# Patient Record
Sex: FEMALE
[De-identification: names, ages, dates, MRNs, and addresses within clinical notes are randomized per-mention and may not be internally consistent; named-entity substitution may affect disease eponyms.]

---

## 2018-07-31 ENCOUNTER — HOSPITAL ENCOUNTER (EMERGENCY)
Dept: HOSPITAL 97 - ER | Age: 38
Discharge: HOME | End: 2018-07-31
Payer: SELF-PAY

## 2018-07-31 DIAGNOSIS — Z88.6: ICD-10-CM

## 2018-07-31 DIAGNOSIS — N61.0: Primary | ICD-10-CM

## 2018-07-31 LAB
BUN BLD-MCNC: 12 MG/DL (ref 7–18)
GLUCOSE SERPLBLD-MCNC: 119 MG/DL (ref 74–106)
HCT VFR BLD CALC: 39.5 % (ref 36–45)
LYMPHOCYTES # SPEC AUTO: 2.3 K/UL (ref 0.7–4.9)
MCH RBC QN AUTO: 29.4 PG (ref 27–35)
MCV RBC: 88 FL (ref 80–100)
PMV BLD: 9.8 FL (ref 7.6–11.3)
POTASSIUM SERPL-SCNC: 3.5 MMOL/L (ref 3.5–5.1)
RBC # BLD: 4.49 M/UL (ref 3.86–4.86)

## 2018-07-31 PROCEDURE — 96365 THER/PROPH/DIAG IV INF INIT: CPT

## 2018-07-31 PROCEDURE — 96361 HYDRATE IV INFUSION ADD-ON: CPT

## 2018-07-31 PROCEDURE — 85025 COMPLETE CBC W/AUTO DIFF WBC: CPT

## 2018-07-31 PROCEDURE — 81003 URINALYSIS AUTO W/O SCOPE: CPT

## 2018-07-31 PROCEDURE — 96375 TX/PRO/DX INJ NEW DRUG ADDON: CPT

## 2018-07-31 PROCEDURE — 87040 BLOOD CULTURE FOR BACTERIA: CPT

## 2018-07-31 PROCEDURE — 96366 THER/PROPH/DIAG IV INF ADDON: CPT

## 2018-07-31 PROCEDURE — 80048 BASIC METABOLIC PNL TOTAL CA: CPT

## 2018-07-31 PROCEDURE — 99284 EMERGENCY DEPT VISIT MOD MDM: CPT

## 2018-07-31 PROCEDURE — 76641 ULTRASOUND BREAST COMPLETE: CPT

## 2018-07-31 PROCEDURE — 36415 COLL VENOUS BLD VENIPUNCTURE: CPT

## 2018-07-31 NOTE — EDPHYS
Physician Documentation                                                                           

 [facility]                                                                

Name: Erin Zoila                                                                              

Age: 38 yrs                                                                                       

Sex: Female                                                                                       

: 1980                                                                                   

MRN: [id number]                                                                                   

Arrival Date: 2018                                                                          

Time: 11:39                                                                                       

Account#: [id number]                                                                            

Bed 16                                                                                            

Private MD:                                                                                       

Kuldeep Lebron                                                                      

HPI:                                                                                              

                                                                                             

12:30 This 38 yrs old  Female presents to ER via Ambulatory with complaints of Breast cp  

      Problem.                                                                                    

12:30 The patient presents with cellulitis of the left breast, the patient presents with a    cp  

      swollen area of the left breast. Description: erythematous, swollen, warm. Onset: The       

      symptoms/episode began/occurred today. Associated signs and symptoms: Pertinent             

      negatives: discharge, drainage, fever.                                                      

12:30 Modifying factors: the symptoms are aggravated by touching.                             cp  

                                                                                                  

OB/GYN:                                                                                           

11:45 LMP N/A - Hysterectomy                                                                  aj  

                                                                                                  

Historical:                                                                                       

- Allergies:                                                                                      

11:45 Morphine;                                                                               aj  

- Home Meds:                                                                                      

11:45 sulfadiazine 500 mg oral tab 2 tabs once daily [Active];                                aj  

- PMHx:                                                                                           

11:45 Crohn's; Ovarian Cancer; Cervical Cancer;                                               aj  

- PSHx:                                                                                           

11:45 Appendectomy; Hysterectomy;                                                             aj  

                                                                                                  

- Immunization history:: Adult Immunizations up to date.                                          

- Social history:: Smoking status: Patient/guardian denies using tobacco.                         

- Ebola Screening: : Patient negative for fever greater than or equal to 101.5 degrees            

  Fahrenheit, and additional compatible Ebola Virus Disease symptoms Patient denies               

  exposure to infectious person Patient denies travel to an Ebola-affected area in the            

  21 days before illness onset No symptoms or risks identified at this time.                      

                                                                                                  

                                                                                                  

ROS:                                                                                              

12:35 Constitutional: Negative for body aches, chills, fever, poor PO intake.                 cp  

12:35 Eyes: Negative for injury, pain, redness, and discharge.                                cp  

12:35 ENT: Negative for drainage from ear(s), ear pain, sore throat, difficulty swallowing,       

      difficulty handling secretions.                                                             

12:35 Cardiovascular: Negative for chest pain, edema, palpitations.                               

12:35 Respiratory: Negative for cough, shortness of breath, wheezing.                             

12:35 Abdomen/GI: Negative for abdominal pain, nausea, vomiting, and diarrhea.                    

12:35 Skin: Positive for erythema, swelling, of the left breast, tenderness.                      

12:35 All other systems are negative.                                                             

                                                                                                  

Exam:                                                                                             

12:40 Head/Face:  Normocephalic, atraumatic.                                                  cp  

12:40 Constitutional: The patient appears in no acute distress, alert, awake,                     

      non-diaphoretic, non-toxic, well developed, well nourished.                                 

12:40 Eyes: Periorbital structures: appear normal, Conjunctiva: normal, no exudate, no        cp  

      injection, Sclera: no appreciated abnormality, Lids and lashes: appear normal,              

      bilaterally.                                                                                

12:40 ENT: External ear(s): are unremarkable, Nose: is normal, Mouth: is normal, Posterior        

      pharynx: is normal, airway is patent, no erythema, no exudate.                              

12:40 Neck: ROM/movement: is normal, is supple, without pain, no range of motions limitations.    

12:40 Chest/axilla: Breasts: cellulitis, that is mild of the left breast, swelling, that is       

      mild upper left breast, tenderness, of the left breast.                                     

12:40 Cardiovascular: Rate: normal, Rhythm: regular.                                              

12:40 Respiratory: the patient does not display signs of respiratory distress,  Respirations:     

      normal, no use of accessory muscles, no retractions, no splinting, no tachypnea,            

      labored breathing, is not present, Breath sounds: are clear throughout, no decreased        

      breath sounds, no stridor, no wheezing.                                                     

12:40 Abdomen/GI: Inspection: abdomen appears normal, Palpation: abdomen is soft and              

      non-tender, in all quadrants.                                                               

12:40 Back: pain, is absent, ROM is normal.                                                       

12:40 Neuro: Orientation: to person, place \T\ time. Mentation: lucid, able to follow commands,   

      Cerebellar function: Motor: moves all fours, Sensation: is normal.                          

                                                                                                  

Vital Signs:                                                                                      

11:45  / 86; Pulse 101; Resp 20; Temp 98.2; Pulse Ox 99% on R/A; Weight 70.31 kg;       aj  

      Height 4 ft. 10 in. (147.32 cm);                                                            

12:48  / 90; Pulse 80; Resp 18; Pulse Ox 97% on R/A;                                    hj  

14:56  / 85; Pulse 85; Resp 18; Pulse Ox 100% on R/A;                                   hj  

11:45 Body Mass Index 32.39 (70.31 kg, 147.32 cm)                                             aj  

                                                                                                  

MDM:                                                                                              

12:00 Patient medically screened.                                                             cp  

12:30 Differential diagnosis: abscess, allergic reaction, cellulitis.                           

15:15 Data reviewed: vital signs, nurses notes, lab test result(s), radiologic studies,       cp  

      ultrasound.                                                                                 

15:15 Counseling: I had a detailed discussion with the patient and/or guardian regarding: the cp  

      historical points, exam findings, and any diagnostic results supporting the                 

      discharge/admit diagnosis, lab results, radiology results, the need for outpatient          

      follow up, a family practitioner, to return to the emergency department if symptoms         

      worsen or persist or if there are any questions or concerns that arise at home.             

      Response to treatment: the patient's symptoms have mildly improved after treatment.         

                                                                                                  

                                                                                             

12:27 Order name: CBC with Diff; Complete Time: 13:03                                         cp  

                                                                                             

13:04 Interpretation: Normal except: WBC 13.2; NEYDA% 75.8; NEUT A 10.0.                          

                                                                                             

12:27 Order name: BMP; Complete Time: 13:28                                                     

                                                                                             

13:28 Interpretation: Normal except: ; GLUC 119.                                          

                                                                                             

12:27 Order name: Blood Culture Adult (2)                                                       

                                                                                             

13:45 Order name: Urine Dipstick--Ancillary (enter results); Complete Time: 14:01             bd  

                                                                                             

14:01 Interpretation: Normal except: UBLD 2+.                                                   

                                                                                             

12:30 Order name: Urine Dipstick-Ancillary (obtain specimen); Complete Time: 13:30              

                                                                                             

12:30 Order name: Urine Pregnancy Test (obtain specimen); Complete Time: 14:54                cp  

                                                                                             

13:10 Order name: BREAST/AXILLA, COMPLETE; Complete Time: 13:28                               EDMS

                                                                                                  

Administered Medications:                                                                         

12:27 CANCELLED (Physician Discretion): morphine 2 mg IVP once                                cp  

12:29 Not Given (Physician Discretion): Zofran 4 mg IVP once; over 2 minutes                  cp  

12:30 Drug: NS 0.9% 1000 ml Route: IV; Rate: 1 bolus; Site: right antecubital;                hj  

15:31 Follow up: IV Status: Completed infusion                                                hj  

12:30 Drug: TORadol 30 mg Route: IVP; Site: right antecubital;                                hj  

13:23 Follow up: Response: No adverse reaction; Pain is decreased                             hj  

13:43 Drug: Clindamycin 900 mg Route: IVPB; Infused Over: 30 mins; Site: left jugular;        hj  

15:31 Follow up: IV Status: Completed infusion                                                hj  

                                                                                                  

                                                                                                  

Disposition:                                                                                      

                                                                                             

15:18 Co-signature as Attending Physician, Kuldeep Meehan MD I agree with the assessment and  [facility] 

      plan of care.                                                                               

                                                                                                  

Disposition:                                                                                      

18 15:15 Discharged to Home. Impression: Mastitis of Left Breast.                           

- Condition is Stable.                                                                            

- Discharge Instructions: Mastitis.                                                               

- Prescriptions for Clindamycin HCl 300 mg Oral Capsule - take 1 capsule by ORAL route            

  every 6 hours for 10 days; 40 capsule. Bactrim - 160 mg Oral Tablet - take 1              

  tablet by ORAL route every 12 hours for 10 days; 20 tablet. Tramadol 50 mg Oral                 

  Tablet - take 1 tablet by ORAL route every 8 hours as needed; 12 tablet.                        

- Work release form, Medication Reconciliation Form, Thank You Letter, Antibiotic                 

  Education, Prescription Opioid Use form.                                                        

- Follow up: Private Physician; When: 2 - 3 days; Reason: Recheck today's complaints.             

- Problem is new.                                                                                 

- Symptoms have improved.                                                                         

                                                                                                  

                                                                                                  

                                                                                                  

Signatures:                                                                                       

Dispatcher [employer]                           EDMS                                                 

Jyothi Hdz RN RN aj Anderson, Corey, MD MD cha Joaquin, Henry RN                      Kuldeep Valencia, PA                         PA   cp                                                   

                                                                                                  

Corrections: (The following items were deleted from the chart)                                    

                                                                                             

12:27 12:27 morphine 2 mg IVP once ordered. cp                                                cp  

12:40 12:27 Extrmty Nonvasular Limited+[id number].BRZ ordered. EDMS                               EDMS

13:10 12:40 BREAST/AXILLA, LIMITED ordered. EDMS                                              EDMS

15:34 15:15 2018 15:15 Discharged to Home. Impression: Mastitis of Left Breast.           

      Condition is Stable. Forms are Medication Reconciliation Form, Thank You Letter,            

      Antibiotic Education, Prescription Opioid Use. Follow up: Private Physician; When: 2 -      

      3 days; Reason: Recheck today's complaints. Problem is new. Symptoms have improved. cp      

                                                                                                  

**************************************************************************************************

## 2018-07-31 NOTE — RAD REPORT
EXAM DESCRIPTION:  US - BREAST/AXILLA, COMPLETE - 7/31/2018 1:11 pm

 

CLINICAL HISTORY:  Left breast pain and swelling

 

FINDINGS:  Whole left breast sonography was performed.

 

Breast tissue overall appears edematous. There is heterogeneity in breast tissue involving all quadra
nts of the breast. A single defined mass is not seen. A few small lymph nodes are seen in the lateral
 aspect of the breast. A 9 mm cyst is present retroareolar left breast. No defined abscess or drainab
le fluid collection.

 

IMPRESSION:  Suspected mastitis without defined abscess or drainable fluid collection.

 

An inflammatory carcinoma or other aggressive process cannot be excluded. Follow-up could be performe
d with mammography and/ or sonography after adequate treatment for the suspected infectious/ inflamma
tory process.

## 2018-07-31 NOTE — ER
Nurse's Notes                                                                                     

 [facility]                                                                

Name: Erin                                                                               

Age: 38 yrs                                                                                       

Sex: Female                                                                                       

: 1980                                                                                   

MRN: [id number]                                                                                   

Arrival Date: 2018                                                                          

Time: 11:39                                                                                       

Account#: [id number]                                                                            

Bed 16                                                                                            

Private MD:                                                                                       

Diagnosis: Mastitis of Left Breast                                                                

                                                                                                  

Presentation:                                                                                     

                                                                                             

11:43 Presenting complaint: Patient states: Redness and swelling to left breast that started  aj  

      today. Transition of care: patient was not received from another setting of care. Onset     

      of symptoms was 2018. Risk Assessment: Do you want to hurt yourself or someone     

      else? Patient reports no desire to harm self or others. Initial Sepsis Screen: Does the     

      patient meet any 2 criteria? No. Patient's initial sepsis screen is negative. Does the      

      patient have a suspected source of infection? No. Patient's initial sepsis screen is        

      negative. Care prior to arrival: None.                                                      

11:43 Method Of Arrival: Ambulatory                                                           aj  

11:43 Acuity: SAMIA 3                                                                           aj  

                                                                                                  

Triage Assessment:                                                                                

11:45 General: Appears in no apparent distress. comfortable, Behavior is calm, cooperative,   aj  

      appropriate for age. Pain: Complains of pain in left breast. Neuro: Level of                

      Consciousness is awake, alert, obeys commands, Oriented to person, place, time,             

      situation, Appropriate for age. Respiratory: Airway is patent Respiratory effort is         

      even, unlabored, Respiratory pattern is regular, symmetrical. Derm: Skin is intact, is      

      healthy with good turgor, Skin is pink, warm \T\ dry. normal, Redness and inflammation to   

      left breast.                                                                                

                                                                                                  

OB/GYN:                                                                                           

11:45 LMP N/A - Hysterectomy                                                                  aj  

                                                                                                  

Historical:                                                                                       

- Allergies:                                                                                      

11:45 Morphine;                                                                               aj  

- Home Meds:                                                                                      

11:45 sulfadiazine 500 mg oral tab 2 tabs once daily [Active];                                aj  

- PMHx:                                                                                           

11:45 Crohn's; Ovarian Cancer; Cervical Cancer;                                               aj  

- PSHx:                                                                                           

11:45 Appendectomy; Hysterectomy;                                                             aj  

                                                                                                  

- Immunization history:: Adult Immunizations up to date.                                          

- Social history:: Smoking status: Patient/guardian denies using tobacco.                         

- Ebola Screening: : Patient negative for fever greater than or equal to 101.5 degrees            

  Fahrenheit, and additional compatible Ebola Virus Disease symptoms Patient denies               

  exposure to infectious person Patient denies travel to an Ebola-affected area in the            

  21 days before illness onset No symptoms or risks identified at this time.                      

                                                                                                  

                                                                                                  

Screenin:49 Abuse screen: Denies threats or abuse. Denies injuries from another. Nutritional        hj  

      screening: No deficits noted. Tuberculosis screening: No symptoms or risk factors           

      identified. Fall Risk None identified.                                                      

                                                                                                  

Assessment:                                                                                       

11:49 General: Appears in no apparent distress. uncomfortable, Behavior is calm, cooperative, hj  

      appropriate for age. Pain: Complains of pain in chest and left breast. Neuro: Level of      

      Consciousness is awake, alert, obeys commands, Oriented to person, place, time,             

      situation, Appropriate for age. Cardiovascular: Capillary refill < 3 seconds Patient's      

      skin is warm and dry. Respiratory: Airway is patent Respiratory effort is even,             

      unlabored, Respiratory pattern is regular, symmetrical. GI: No signs and/or symptoms        

      were reported involving the gastrointestinal system. : No signs and/or symptoms were      

      reported regarding the genitourinary system. EENT: No signs and/or symptoms were            

      reported regarding the EENT system. Derm: lump on the L breast. Musculoskeletal: No         

      signs and/or symptoms reported regarding the musculoskeletal system.                        

12:22 Reassessment: provider in room;.                                                        hj  

13:30 Reassessment: Patient and/or family updated on plan of care and expected duration. Pain hj  

      level reassessed. Patient is alert, oriented x 3, equal unlabored respirations, skin        

      warm/dry/pink. Patient states feeling better.                                               

14:56 Reassessment: Patient and/or family updated on plan of care and expected duration. Pain hj  

      level reassessed. Patient is alert, oriented x 3, equal unlabored respirations, skin        

      warm/dry/pink. awaiting POC: Patient states feeling better.                                 

                                                                                                  

Vital Signs:                                                                                      

11:45  / 86; Pulse 101; Resp 20; Temp 98.2; Pulse Ox 99% on R/A; Weight 70.31 kg;       aj  

      Height 4 ft. 10 in. (147.32 cm);                                                            

12:48  / 90; Pulse 80; Resp 18; Pulse Ox 97% on R/A;                                    hj  

14:56  / 85; Pulse 85; Resp 18; Pulse Ox 100% on R/A;                                   hj  

11:45 Body Mass Index 32.39 (70.31 kg, 147.32 cm)                                             aj  

                                                                                                  

ED Course:                                                                                        

11:39 Patient arrived in ED.                                                                  mr  

11:44 Triage completed.                                                                       aj  

11:45 Arm band placed on right wrist. Patient placed in waiting room, Patient notified of     aj  

      wait time.                                                                                  

11:49 Anoop Mondragon, RN is Primary Nurse.                                                    hj  

11:49 Patient has correct armband on for positive identification. Placed in gown. Bed in low  hj  

      position. Call light in reach. Side rails up X 1.                                           

11:59 Kuldeep Guerrero PA is PHCP.                                                                cp  

11:59 Kuldeep Meehan MD is Attending Physician.                                             cp  

12:23 served as chaperone during breast exam, MAMI Brandt performed breast exam.              tw2 

12:40 Inserted saline lock: 22 gauge in right antecubital area, using aseptic technique.      hj  

      Blood collected.                                                                            

12:40 Initial lab(s) drawn, sent to lab. First set of blood cultures drawn by me.             hj  

12:46 BMP Sent.                                                                               hj  

12:46 CBC with Diff Sent.                                                                     hj  

12:50 Patient taken to ultrasound.                                                            hr  

13:11 BREAST/AXILLA, COMPLETE In Process Unspecified.                                         EDMS

13:15 Patient moved back from ultrasound.                                                     aa4 

13:22 Ultrasound completed.                                                                   hr  

15:34 IV discontinued, intact, bleeding controlled, No redness/swelling at site. Pressure     hj  

      dressing applied.                                                                           

                                                                                                  

Administered Medications:                                                                         

12:27 CANCELLED (Physician Discretion): morphine 2 mg IVP once                                cp  

12:29 Not Given (Physician Discretion): Zofran 4 mg IVP once; over 2 minutes                  cp  

12:30 Drug: NS 0.9% 1000 ml Route: IV; Rate: 1 bolus; Site: right antecubital;                hj  

15:31 Follow up: IV Status: Completed infusion                                                hj  

12:30 Drug: TORadol 30 mg Route: IVP; Site: right antecubital;                                hj  

13:23 Follow up: Response: No adverse reaction; Pain is decreased                             hj  

13:43 Drug: Clindamycin 900 mg Route: IVPB; Infused Over: 30 mins; Site: left jugular;        hj  

15:31 Follow up: IV Status: Completed infusion                                                hj  

                                                                                                  

                                                                                                  

Outcome:                                                                                          

15:15 Discharge ordered by MD.                                                                cp  

15:34 Discharged to home ambulatory.                                                          hj  

15:34 Condition: stable                                                                           

15:34 Discharge instructions given to patient, Instructed on discharge instructions, follow       

      up and referral plans. medication usage, Demonstrated understanding of instructions,        

      follow-up care, medications, Prescriptions given X 3.                                       

15:34 Patient left the ED.                                                                    hj  

                                                                                                  

Signatures:                                                                                       

Dispatcher [employer]                           EDJyothi Salazar RN                       RN   Lisa Cantrell                                mr                                                   

BarFerey                                   hr                                                   

Jyothi Hilliard                              aa4                                                  

Anoop Mondragon, RN                      RN   Kuldeep Morales PA PA cp Wise, Tara, RN                          RN   tw2                                                  

                                                                                                  

**************************************************************************************************